# Patient Record
Sex: MALE | Race: BLACK OR AFRICAN AMERICAN | NOT HISPANIC OR LATINO
[De-identification: names, ages, dates, MRNs, and addresses within clinical notes are randomized per-mention and may not be internally consistent; named-entity substitution may affect disease eponyms.]

---

## 2017-04-18 ENCOUNTER — TRANSCRIPTION ENCOUNTER (OUTPATIENT)
Age: 27
End: 2017-04-18

## 2017-11-09 ENCOUNTER — EMERGENCY (EMERGENCY)
Facility: HOSPITAL | Age: 27
LOS: 1 days | Discharge: HOME | End: 2017-11-09
Attending: EMERGENCY MEDICINE | Admitting: EMERGENCY MEDICINE
Payer: MEDICAID

## 2017-11-09 VITALS
WEIGHT: 164.91 LBS | HEART RATE: 91 BPM | OXYGEN SATURATION: 100 % | SYSTOLIC BLOOD PRESSURE: 120 MMHG | DIASTOLIC BLOOD PRESSURE: 75 MMHG | RESPIRATION RATE: 18 BRPM | TEMPERATURE: 98 F | HEIGHT: 69 IN

## 2017-11-09 PROCEDURE — 96372 THER/PROPH/DIAG INJ SC/IM: CPT

## 2017-11-09 PROCEDURE — 72100 X-RAY EXAM L-S SPINE 2/3 VWS: CPT | Mod: 26

## 2017-11-09 PROCEDURE — 72100 X-RAY EXAM L-S SPINE 2/3 VWS: CPT

## 2017-11-09 PROCEDURE — 99284 EMERGENCY DEPT VISIT MOD MDM: CPT

## 2017-11-09 PROCEDURE — 99283 EMERGENCY DEPT VISIT LOW MDM: CPT | Mod: 25

## 2017-11-09 RX ORDER — KETOROLAC TROMETHAMINE 30 MG/ML
60 SYRINGE (ML) INJECTION ONCE
Qty: 0 | Refills: 0 | Status: DISCONTINUED | OUTPATIENT
Start: 2017-11-09 | End: 2017-11-09

## 2017-11-09 RX ORDER — CYCLOBENZAPRINE HYDROCHLORIDE 10 MG/1
10 TABLET, FILM COATED ORAL ONCE
Qty: 0 | Refills: 0 | Status: COMPLETED | OUTPATIENT
Start: 2017-11-09 | End: 2017-11-09

## 2017-11-09 RX ORDER — CYCLOBENZAPRINE HYDROCHLORIDE 10 MG/1
1 TABLET, FILM COATED ORAL
Qty: 9 | Refills: 0 | OUTPATIENT
Start: 2017-11-09 | End: 2017-11-12

## 2017-11-09 RX ADMIN — CYCLOBENZAPRINE HYDROCHLORIDE 10 MILLIGRAM(S): 10 TABLET, FILM COATED ORAL at 15:33

## 2017-11-09 RX ADMIN — Medication 60 MILLIGRAM(S): at 15:34

## 2017-11-09 NOTE — ED STATDOCS - ATTENDING CONTRIBUTION TO CARE
I, Juan Vyas, performed the initial face to face bedside interview with this patient regarding history of present illness, review of symptoms and relevant past medical, social and family history.  I completed an independent physical examination.  I was the initial provider who evaluated this patient. I have signed out the follow up of any pending tests (i.e. labs, radiological studies) to the ACP.  I have communicated the patient’s plan of care and disposition with the ACP.

## 2017-11-09 NOTE — ED STATDOCS - MUSCULOSKELETAL, MLM
Spine is midline. No midline TTP. No bony deformity. No step off. L paraspinal lumbar TTP. Positive straight leg test on the L.
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
## 2017-11-09 NOTE — ED STATDOCS - MEDICAL DECISION MAKING DETAILS
28 y/o M with back pain will get x-ray, pain control and re-evaluate. H&P consistent with sciatica. pt well appearing, neuro stable, able to ambulate, pain imrpoved. Advsied NSAIDs and spine center f/u

## 2017-11-09 NOTE — ED STATDOCS - PROGRESS NOTE DETAILS
Agreed with HPI/ROS/PE/Orders from intake, will f/u with x-ray and reassess pt after medication is given X-ray: Unremarkable, if pt reports improvement in pain after medication is given will d/c, send prescription over to pts pharmacy, f/u with spine center
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
## 2017-11-20 ENCOUNTER — APPOINTMENT (OUTPATIENT)
Dept: ORTHOPEDIC SURGERY | Facility: CLINIC | Age: 27
End: 2017-11-20
Payer: MEDICAID

## 2017-11-20 VITALS
BODY MASS INDEX: 23.45 KG/M2 | SYSTOLIC BLOOD PRESSURE: 117 MMHG | HEART RATE: 59 BPM | WEIGHT: 162 LBS | HEIGHT: 69.5 IN | DIASTOLIC BLOOD PRESSURE: 78 MMHG

## 2017-11-20 DIAGNOSIS — Z78.9 OTHER SPECIFIED HEALTH STATUS: ICD-10-CM

## 2017-11-20 DIAGNOSIS — M25.511 PAIN IN RIGHT SHOULDER: ICD-10-CM

## 2017-11-20 DIAGNOSIS — S43.401A UNSPECIFIED SPRAIN OF RIGHT SHOULDER JOINT, INITIAL ENCOUNTER: ICD-10-CM

## 2017-11-20 DIAGNOSIS — M25.512 PAIN IN RIGHT SHOULDER: ICD-10-CM

## 2017-11-20 PROCEDURE — 99203 OFFICE O/P NEW LOW 30 MIN: CPT

## 2017-11-20 PROCEDURE — 73030 X-RAY EXAM OF SHOULDER: CPT | Mod: LT

## 2017-12-21 ENCOUNTER — OUTPATIENT (OUTPATIENT)
Dept: OUTPATIENT SERVICES | Facility: HOSPITAL | Age: 27
LOS: 1 days | End: 2017-12-21
Payer: COMMERCIAL

## 2017-12-21 DIAGNOSIS — Z51.89 ENCOUNTER FOR OTHER SPECIFIED AFTERCARE: ICD-10-CM

## 2017-12-21 DIAGNOSIS — M25.511 PAIN IN RIGHT SHOULDER: ICD-10-CM

## 2018-01-19 PROCEDURE — 97010 HOT OR COLD PACKS THERAPY: CPT

## 2018-01-19 PROCEDURE — 97110 THERAPEUTIC EXERCISES: CPT

## 2018-01-19 PROCEDURE — 97161 PT EVAL LOW COMPLEX 20 MIN: CPT

## 2018-01-19 PROCEDURE — 97140 MANUAL THERAPY 1/> REGIONS: CPT

## 2018-07-10 ENCOUNTER — TRANSCRIPTION ENCOUNTER (OUTPATIENT)
Age: 28
End: 2018-07-10

## 2018-09-22 ENCOUNTER — TRANSCRIPTION ENCOUNTER (OUTPATIENT)
Age: 28
End: 2018-09-22

## 2019-11-24 ENCOUNTER — TRANSCRIPTION ENCOUNTER (OUTPATIENT)
Age: 29
End: 2019-11-24

## 2020-02-03 ENCOUNTER — TRANSCRIPTION ENCOUNTER (OUTPATIENT)
Age: 30
End: 2020-02-03

## 2020-02-10 ENCOUNTER — TRANSCRIPTION ENCOUNTER (OUTPATIENT)
Age: 30
End: 2020-02-10

## 2020-03-23 ENCOUNTER — EMERGENCY (EMERGENCY)
Facility: HOSPITAL | Age: 30
LOS: 1 days | Discharge: HOME | End: 2020-03-23
Attending: STUDENT IN AN ORGANIZED HEALTH CARE EDUCATION/TRAINING PROGRAM
Payer: COMMERCIAL

## 2020-03-23 VITALS
TEMPERATURE: 98 F | WEIGHT: 184.97 LBS | OXYGEN SATURATION: 98 % | RESPIRATION RATE: 18 BRPM | HEIGHT: 71 IN | SYSTOLIC BLOOD PRESSURE: 133 MMHG | HEART RATE: 94 BPM | DIASTOLIC BLOOD PRESSURE: 79 MMHG

## 2020-03-23 LAB — RAPID RVP RESULT: SIGNIFICANT CHANGE UP

## 2020-03-23 PROCEDURE — 87581 M.PNEUMON DNA AMP PROBE: CPT

## 2020-03-23 PROCEDURE — 87486 CHLMYD PNEUM DNA AMP PROBE: CPT

## 2020-03-23 PROCEDURE — 87633 RESP VIRUS 12-25 TARGETS: CPT

## 2020-03-23 PROCEDURE — 87798 DETECT AGENT NOS DNA AMP: CPT

## 2020-03-23 PROCEDURE — U0001: CPT

## 2020-03-23 PROCEDURE — 99283 EMERGENCY DEPT VISIT LOW MDM: CPT

## 2020-03-23 PROCEDURE — 99284 EMERGENCY DEPT VISIT MOD MDM: CPT

## 2020-03-23 NOTE — ED PROVIDER NOTE - PATIENT PORTAL LINK FT
You can access the FollowMyHealth Patient Portal offered by [facility] by registering at the following website: [email or web address]. By joining [employer]’s FollowMyHealth portal, you will also be able to view your health information using other applications (apps) compatible with our system.

## 2020-03-23 NOTE — ED ADULT NURSE NOTE - CHIEF COMPLAINT QUOTE
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

## 2020-03-23 NOTE — ED PROVIDER NOTE - OBJECTIVE STATEMENT
31 y/o M with no PMH presents to ED for c/o cough, body aches, sore throat, runny nose, since last week. States last night symptoms got worse, he had chills; did not take temperature, and also now with nausea, decreased PO intake and 2 days of non bloody diarrhea (one episodes yesterday, one today). Denies abdominal pain. 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 Did not received flu vaccine this season. Has been taking Tylenol and DayQuil with minimum relief. Last dose of DayQuil "last week sometime".

## 2020-03-23 NOTE — ED PROVIDER NOTE - CROS ED GU ALL NEG
Use Topics   • Smoking status: Never Smoker   • Smokeless tobacco: Never Used   • Alcohol use 0.0 oz/week      Comment: socially       REVIEW OF SYSTEMS     Review of Systems   Constitutional: Negative for fatigue and fever.   Gastrointestinal: Positive for diarrhea. Negative for abdominal pain, blood in stool, constipation, nausea and vomiting.   Neurological: Negative for dizziness and headaches.       PHYSICAL EXAM     Physical Exam   Constitutional: He is oriented to person, place, and time. He appears well-developed and well-nourished. No distress.   HENT:   Head: Normocephalic and atraumatic.   Neck: No tracheal deviation present. No thyromegaly present.   Cardiovascular: Normal rate, regular rhythm and normal heart sounds.    Abdominal: Soft. Bowel sounds are normal. He exhibits no distension and no mass. There is no tenderness. There is no rebound and no guarding.   Neurological: He is alert and oriented to person, place, and time.   Skin: Skin is warm and dry. He is not diaphoretic.   Psychiatric: He has a normal mood and affect. His behavior is normal.   Vitals reviewed.      ASSESSMENT/PLAN     Osman was seen today for diarrhea.    Diagnoses and all orders for this visit:    Diarrhea, acute,unclear etiology  -     ENTERIC PATHOGEN CULTURE; Future  -     C DIFFICILE BY PCR; Future  -     NOROVIRUS GI/GII; Future    I recommended he decrease milk and fruit juice consumption for the next week and drink plenty of water. A banana twice a day and one regular strength TUMs twice a day if needed to help control the diarrhea -- if this is not effective or if new symptoms emerge then he needs to call the office.       negative...

## 2020-03-23 NOTE — ED PROVIDER NOTE - ATTENDING CONTRIBUTION TO CARE
29 yo male with acute viral syndrome. I personally saw the patient with the NP student, and completed the key components of the history and physical exam. I then discussed the management plan with the NP student.

## 2020-03-23 NOTE — ED PROVIDER NOTE - ADDITIONAL NOTES AND INSTRUCTIONS:
Self isolate for 14 days. Do not return to work unless COVID test is negative and your symptoms have improved.

## 2020-03-23 NOTE — ED PROVIDER NOTE - CLINICAL SUMMARY MEDICAL DECISION MAKING FREE TEXT BOX
31 y/o M with no PMH presents to ED with cough, body aches, sore throat, chills also with 2 days of nonbloody diarrhea (2 episodes in total), denies abdominal pain, patient appears to be in no distress, lungs CTA bilaterally, Spo2 100% on room air, abd soft NTND, +nasal congestion and post nasal drip appreciated on exam, will send RVP and COVID test and d/c home with home isolation x 14 days or until results

## 2020-03-23 NOTE — ED PROVIDER NOTE - NSFOLLOWUPINSTRUCTIONS_ED_ALL_ED_FT
Follow pre-printed discharge instructions provided containing information on self-quarantine and monitoring.    Rest.  Fluids.    Tylenol 1-2 tablets every 6 hours as needed for fever.    Practice good hand hygiene.    Follow up with your primary care physician.    Please return to ER immediately for trouble breathing, shortness of breath, or any other problems or concerns arise.  Please call ahead if possible.

## 2020-03-23 NOTE — ED ADULT TRIAGE NOTE - CHIEF COMPLAINT QUOTE
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

## 2020-03-24 LAB — SARS-COV-2 RNA SPEC QL NAA+PROBE: SIGNIFICANT CHANGE UP

## 2020-04-25 ENCOUNTER — MESSAGE (OUTPATIENT)
Age: 30
End: 2020-04-25

## 2020-05-24 ENCOUNTER — TRANSCRIPTION ENCOUNTER (OUTPATIENT)
Age: 30
End: 2020-05-24

## 2020-08-01 ENCOUNTER — TRANSCRIPTION ENCOUNTER (OUTPATIENT)
Age: 30
End: 2020-08-01

## 2021-03-02 ENCOUNTER — EMERGENCY (EMERGENCY)
Facility: HOSPITAL | Age: 31
LOS: 1 days | Discharge: HOME | End: 2021-03-02
Payer: COMMERCIAL

## 2021-03-02 VITALS
HEART RATE: 91 BPM | OXYGEN SATURATION: 97 % | DIASTOLIC BLOOD PRESSURE: 70 MMHG | HEIGHT: 71 IN | RESPIRATION RATE: 18 BRPM | WEIGHT: 179.9 LBS | SYSTOLIC BLOOD PRESSURE: 107 MMHG | TEMPERATURE: 98 F

## 2021-03-02 PROCEDURE — 99282 EMERGENCY DEPT VISIT SF MDM: CPT

## 2021-03-02 PROCEDURE — 99283 EMERGENCY DEPT VISIT LOW MDM: CPT

## 2021-03-02 PROCEDURE — U0003: CPT

## 2021-03-02 PROCEDURE — U0005: CPT

## 2021-03-02 NOTE — ED PROVIDER NOTE - PATIENT PORTAL LINK FT
You can access the FollowMyHealth Patient Portal offered by [facility] by registering at the following website: [email or web address]. By joining [employer]’s FollowMyHealth portal, you will also be able to view your health information using other applications (apps) compatible with our system.

## 2021-03-02 NOTE — ED PROVIDER NOTE - OBJECTIVE STATEMENT
32yo M presenting for covid testing. Pt's son had +contact in class room, now quarantining for 10 days. States son is not showing symptoms. Only exposure was to son. Pt feeling well, no symptoms. Denies fever, chills, cough, sob, cp, body aches, loss of smell/taste.

## 2021-03-03 LAB — SARS-COV-2 RNA SPEC QL NAA+PROBE: SIGNIFICANT CHANGE UP

## 2021-04-13 ENCOUNTER — TRANSCRIPTION ENCOUNTER (OUTPATIENT)
Age: 31
End: 2021-04-13

## 2021-10-02 ENCOUNTER — TRANSCRIPTION ENCOUNTER (OUTPATIENT)
Age: 31
End: 2021-10-02

## 2021-10-02 ENCOUNTER — EMERGENCY (EMERGENCY)
Facility: HOSPITAL | Age: 31
LOS: 1 days | Discharge: HOME | End: 2021-10-02
Attending: EMERGENCY MEDICINE
Payer: COMMERCIAL

## 2021-10-02 VITALS
RESPIRATION RATE: 16 BRPM | DIASTOLIC BLOOD PRESSURE: 75 MMHG | HEART RATE: 99 BPM | SYSTOLIC BLOOD PRESSURE: 128 MMHG | OXYGEN SATURATION: 100 % | TEMPERATURE: 99 F

## 2021-10-02 VITALS
DIASTOLIC BLOOD PRESSURE: 84 MMHG | WEIGHT: 192.46 LBS | TEMPERATURE: 99 F | OXYGEN SATURATION: 97 % | HEART RATE: 85 BPM | SYSTOLIC BLOOD PRESSURE: 126 MMHG | RESPIRATION RATE: 18 BRPM | HEIGHT: 71 IN

## 2021-10-02 PROCEDURE — 99284 EMERGENCY DEPT VISIT MOD MDM: CPT

## 2021-10-02 PROCEDURE — 99282 EMERGENCY DEPT VISIT SF MDM: CPT

## 2021-10-02 NOTE — ED PROVIDER NOTE - PATIENT PORTAL LINK FT
You can access the FollowMyHealth Patient Portal offered by [facility] by registering at the following website: [email or web address]. By joining [employer]’s FollowMyHealth portal, you will also be able to view your health information using other applications (apps) compatible with our system.

## 2021-10-02 NOTE — ED PROVIDER NOTE - OBJECTIVE STATEMENT
32 y/o M with hx of asthma and obesity c/o shortness of breath, cough and fever x 1 week.  Patient tested positive for covid at [facility] in [place] on 9/28.  Patient received 1st dose of Pfizer vaccine on 9/24. 31 year old M with hx of asthma and obesity c/o shortness of breath, cough and fever x 1 week.  Patient tested positive for covid at [facility] in [place] on 9/28.  Patient received 1st dose of Pfizer vaccine on 9/24.

## 2021-10-03 ENCOUNTER — OUTPATIENT (OUTPATIENT)
Dept: INPATIENT UNIT | Facility: HOSPITAL | Age: 31
LOS: 1 days | End: 2021-10-03
Payer: COMMERCIAL

## 2021-10-03 ENCOUNTER — APPOINTMENT (OUTPATIENT)
Dept: DISASTER EMERGENCY | Facility: HOSPITAL | Age: 31
End: 2021-10-03

## 2021-10-03 VITALS
DIASTOLIC BLOOD PRESSURE: 79 MMHG | RESPIRATION RATE: 16 BRPM | TEMPERATURE: 100 F | SYSTOLIC BLOOD PRESSURE: 111 MMHG | OXYGEN SATURATION: 97 % | HEART RATE: 93 BPM

## 2021-10-03 VITALS
HEIGHT: 72 IN | RESPIRATION RATE: 16 BRPM | SYSTOLIC BLOOD PRESSURE: 122 MMHG | HEART RATE: 106 BPM | WEIGHT: 199.96 LBS | OXYGEN SATURATION: 95 % | TEMPERATURE: 101 F | DIASTOLIC BLOOD PRESSURE: 74 MMHG

## 2021-10-03 DIAGNOSIS — U07.1 COVID-19: ICD-10-CM

## 2021-10-03 PROCEDURE — M0243: CPT

## 2021-10-03 RX ORDER — SODIUM CHLORIDE 9 MG/ML
250 INJECTION INTRAMUSCULAR; INTRAVENOUS; SUBCUTANEOUS
Refills: 0 | Status: COMPLETED | OUTPATIENT
Start: 2021-10-03 | End: 2021-10-03

## 2021-10-03 RX ADMIN — SODIUM CHLORIDE 310 MILLILITER(S): 9 INJECTION INTRAMUSCULAR; INTRAVENOUS; SUBCUTANEOUS at 08:32

## 2021-10-03 NOTE — CHART NOTE - NSCHARTNOTEFT_GEN_A_CORE
CC: Monoclonal Antibody Infusion /COVID 19 Positive      History: Patient presents for infusion of monoclonal antibody infusion. Patient has been screened and was deemed to be a candidate.    Symptoms/ Criteria: fever chills aches and previous vaccine     Risk Profile includes: vaccinated         PMHx:  Infection due to severe acute respiratory syndrome coronavirus 2 (SARS-CoV-2)    No pertinent family history in first degree relatives    No pertinent past medical history    Asthma    No significant past surgical history    No significant past surgical history    COVID-19          T(C): 38.4 (10-03-21 @ 08:53), Max: 38.4 (10-03-21 @ 08:00)  HR: 96 (10-03-21 @ 08:53) (85 - 106)  BP: 117/75 (10-03-21 @ 08:53) (114/75 - 128/75)  RR: 16 (10-03-21 @ 08:53) (16 - 18)  SpO2: 97% (10-03-21 @ 08:53) (95% - 100%)      PE:   Appearance: NAD	  HEENT:   Normal oral mucosa.   Lymphatic: No lymphadenopathy  Cardiovascular: Normal S1 S2, No JVD, No murmurs, No edema  Respiratory: Lungs clear to auscultation	  Gastrointestinal:  Soft, Non-tender. No guarding   Skin: warm and dry  Neurologic: Non-focal  Extremities: Normal range of motion.     ASSESSMENT:  Pt is a 31y year old Male Covid +  referred to the infusion center for Monoclonal antibody infusion (Regeneron).        PLAN:  - infusion procedure explained to patient   - Consent for monoclonal antibody infusion obtained   - Risk & benefits discussed/all questions answered  - infuse Regeneron over one hour   - will observe patient for one hour post infusion  and then if stable discharge home with oupt follow up as planned by PMD.    POST INFUSION ASSESSMENT:   DISCHARGE at approximately  after an hour    - Patient tolerated infusion well denies complaints of chest pain/SOB/dizziness/ palps  - VSS for discharge home  - D/C instructions given/ fact sheet included.  - Patient to follow-up with PCP as needed.

## 2021-10-05 ENCOUNTER — TRANSCRIPTION ENCOUNTER (OUTPATIENT)
Age: 31
End: 2021-10-05

## 2021-10-05 ENCOUNTER — EMERGENCY (EMERGENCY)
Facility: HOSPITAL | Age: 31
LOS: 1 days | Discharge: LEFT BEFORE DISCHARGE | End: 2021-10-05
Payer: COMMERCIAL

## 2021-10-05 VITALS
HEART RATE: 92 BPM | SYSTOLIC BLOOD PRESSURE: 115 MMHG | OXYGEN SATURATION: 96 % | TEMPERATURE: 99 F | HEIGHT: 72 IN | DIASTOLIC BLOOD PRESSURE: 77 MMHG | RESPIRATION RATE: 20 BRPM | WEIGHT: 190.04 LBS

## 2021-10-05 PROCEDURE — L9991: CPT

## 2021-10-05 NOTE — ED ADULT TRIAGE NOTE - CHIEF COMPLAINT QUOTE
Pt arrives to ED states he tested positive for COVID last week . He received first dose of Pfizer vaccine on 24th . Was sent by " [facility] for low oxygen"

## 2021-10-06 PROBLEM — J45.909 UNSPECIFIED ASTHMA, UNCOMPLICATED: Chronic | Status: ACTIVE | Noted: 2021-10-02

## 2021-10-16 ENCOUNTER — TRANSCRIPTION ENCOUNTER (OUTPATIENT)
Age: 31
End: 2021-10-16

## 2021-10-22 ENCOUNTER — APPOINTMENT (OUTPATIENT)
Dept: INTERNAL MEDICINE | Facility: CLINIC | Age: 31
End: 2021-10-22
Payer: MEDICAID

## 2021-10-22 ENCOUNTER — NON-APPOINTMENT (OUTPATIENT)
Age: 31
End: 2021-10-22

## 2021-10-22 VITALS
TEMPERATURE: 98.3 F | OXYGEN SATURATION: 99 % | BODY MASS INDEX: 26.88 KG/M2 | WEIGHT: 192 LBS | HEART RATE: 93 BPM | SYSTOLIC BLOOD PRESSURE: 104 MMHG | DIASTOLIC BLOOD PRESSURE: 68 MMHG | RESPIRATION RATE: 15 BRPM | HEIGHT: 71 IN

## 2021-10-22 DIAGNOSIS — Z00.00 ENCOUNTER FOR GENERAL ADULT MEDICAL EXAMINATION W/OUT ABNORMAL FINDINGS: ICD-10-CM

## 2021-10-22 DIAGNOSIS — Z28.21 IMMUNIZATION NOT CARRIED OUT BECAUSE OF PATIENT REFUSAL: ICD-10-CM

## 2021-10-22 DIAGNOSIS — Z13.6 ENCOUNTER FOR SCREENING FOR CARDIOVASCULAR DISORDERS: ICD-10-CM

## 2021-10-22 DIAGNOSIS — Z13.31 ENCOUNTER FOR SCREENING FOR DEPRESSION: ICD-10-CM

## 2021-10-22 PROCEDURE — G0444 DEPRESSION SCREEN ANNUAL: CPT | Mod: 59

## 2021-10-22 PROCEDURE — 99385 PREV VISIT NEW AGE 18-39: CPT | Mod: 25

## 2021-10-22 PROCEDURE — 93000 ELECTROCARDIOGRAM COMPLETE: CPT | Mod: 59

## 2021-10-22 PROCEDURE — 36415 COLL VENOUS BLD VENIPUNCTURE: CPT

## 2021-10-22 NOTE — HISTORY OF PRESENT ILLNESS
[FreeTextEntry1] : CPE  [de-identified] : 31 \par Tested positive 09/28/2021- s/p AB infusion 10/3/2021. At the time No taste, no smell, high fevers, cough with no desats and extreme muscle aches as per patient. \par Had 1/2 vaccine- [facility] 9/24/2021 - Patient will be getting it in three months \par Patient states since he has ab infusion all of his symptoms have resolved. No coughing, no shortness of breath or any other acute complaint. \par \par HCM \par tdap states he does not remember but thinks it has been less than 10 years \par flu- no interested \par

## 2021-10-22 NOTE — HEALTH RISK ASSESSMENT
[No] : No [0] : 2) Feeling down, depressed, or hopeless: Not at all (0) [PHQ-2 Negative - No further assessment needed] : PHQ-2 Negative - No further assessment needed [With Family] : lives with family [Employed] : employed [College] : College [Significant Other] : lives with significant other [Sexually Active] : sexually active [Feels Safe at Home] : Feels safe at home [Fully functional (bathing, dressing, toileting, transferring, walking, feeding)] : Fully functional (bathing, dressing, toileting, transferring, walking, feeding) [Fully functional (using the telephone, shopping, preparing meals, housekeeping, doing laundry, using] : Fully functional and needs no help or supervision to perform IADLs (using the telephone, shopping, preparing meals, housekeeping, doing laundry, using transportation, managing medications and managing finances) [] : No [ADH0Joypo] : 0 [Reports changes in hearing] : Reports no changes in hearing [Reports changes in vision] : Reports no changes in vision [FreeTextEntry2] : 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

## 2021-10-22 NOTE — ASSESSMENT
[FreeTextEntry1] : HCM\par Had 1/2 vaccine- Pfizer 9/24/2021 - Patient will be getting it in three months \par Tdap unsure states it has been less than 10 years, refused today\par Refused flu shot \par Screening for depression negative \par EKG unchanged from prior

## 2021-10-25 ENCOUNTER — NON-APPOINTMENT (OUTPATIENT)
Age: 31
End: 2021-10-25

## 2021-10-25 ENCOUNTER — APPOINTMENT (OUTPATIENT)
Dept: INTERNAL MEDICINE | Facility: CLINIC | Age: 31
End: 2021-10-25

## 2021-10-25 LAB
ALBUMIN SERPL ELPH-MCNC: 4.6 G/DL
ALP BLD-CCNC: 81 U/L
ALT SERPL-CCNC: 24 U/L
ANION GAP SERPL CALC-SCNC: 12 MMOL/L
AST SERPL-CCNC: 18 U/L
BASOPHILS # BLD AUTO: 0.02 K/UL
BASOPHILS NFR BLD AUTO: 0.4 %
BILIRUB SERPL-MCNC: 0.7 MG/DL
BUN SERPL-MCNC: 8 MG/DL
CALCIUM SERPL-MCNC: 10.2 MG/DL
CHLORIDE SERPL-SCNC: 107 MMOL/L
CHOLEST SERPL-MCNC: 159 MG/DL
CO2 SERPL-SCNC: 24 MMOL/L
CREAT SERPL-MCNC: 0.9 MG/DL
EOSINOPHIL # BLD AUTO: 0.19 K/UL
EOSINOPHIL NFR BLD AUTO: 3.7 %
ESTIMATED AVERAGE GLUCOSE: 114 MG/DL
GLUCOSE SERPL-MCNC: 104 MG/DL
HBA1C MFR BLD HPLC: 5.6 %
HCT VFR BLD CALC: 46 %
HCV AB SER QL: NONREACTIVE
HCV S/CO RATIO: 0.33 S/CO
HDLC SERPL-MCNC: 46 MG/DL
HGB BLD-MCNC: 14.2 G/DL
HIV1+2 AB SPEC QL IA.RAPID: NONREACTIVE
IMM GRANULOCYTES NFR BLD AUTO: 0 %
LDLC SERPL CALC-MCNC: 92 MG/DL
LYMPHOCYTES # BLD AUTO: 2.82 K/UL
LYMPHOCYTES NFR BLD AUTO: 55.4 %
MAN DIFF?: NORMAL
MCHC RBC-ENTMCNC: 27.4 PG
MCHC RBC-ENTMCNC: 30.9 GM/DL
MCV RBC AUTO: 88.8 FL
MONOCYTES # BLD AUTO: 0.42 K/UL
MONOCYTES NFR BLD AUTO: 8.3 %
NEUTROPHILS # BLD AUTO: 1.64 K/UL
NEUTROPHILS NFR BLD AUTO: 32.2 %
NONHDLC SERPL-MCNC: 113 MG/DL
PLATELET # BLD AUTO: 259 K/UL
POTASSIUM SERPL-SCNC: 4.7 MMOL/L
PROT SERPL-MCNC: 7.6 G/DL
RBC # BLD: 5.18 M/UL
RBC # FLD: 14.2 %
SODIUM SERPL-SCNC: 142 MMOL/L
TRIGL SERPL-MCNC: 105 MG/DL
TSH SERPL-ACNC: 0.77 UIU/ML
WBC # FLD AUTO: 5.09 K/UL

## 2022-10-13 PROBLEM — Z13.31 SCREENING FOR DEPRESSION: Status: ACTIVE | Noted: 2021-10-22

## 2023-04-06 ENCOUNTER — NON-APPOINTMENT (OUTPATIENT)
Age: 33
End: 2023-04-06

## 2023-05-19 ENCOUNTER — NON-APPOINTMENT (OUTPATIENT)
Age: 33
End: 2023-05-19

## 2023-12-09 ENCOUNTER — NON-APPOINTMENT (OUTPATIENT)
Age: 33
End: 2023-12-09

## 2023-12-10 ENCOUNTER — RESULT REVIEW (OUTPATIENT)
Age: 33
End: 2023-12-10

## 2024-02-29 ENCOUNTER — NON-APPOINTMENT (OUTPATIENT)
Age: 34
End: 2024-02-29

## 2024-07-26 ENCOUNTER — OUTPATIENT (OUTPATIENT)
Dept: OUTPATIENT SERVICES | Facility: HOSPITAL | Age: 34
LOS: 1 days | Discharge: HOME | End: 2024-07-26

## 2024-07-26 DIAGNOSIS — D72.820 LYMPHOCYTOSIS (SYMPTOMATIC): ICD-10-CM

## 2024-07-29 ENCOUNTER — RESULT REVIEW (OUTPATIENT)
Age: 34
End: 2024-07-29

## 2024-07-29 ENCOUNTER — APPOINTMENT (OUTPATIENT)
Dept: HEMATOLOGY ONCOLOGY | Facility: CLINIC | Age: 34
End: 2024-07-29
Payer: COMMERCIAL

## 2024-07-29 VITALS
WEIGHT: 222.55 LBS | BODY MASS INDEX: 31.16 KG/M2 | HEIGHT: 71 IN | HEART RATE: 84 BPM | OXYGEN SATURATION: 96 % | DIASTOLIC BLOOD PRESSURE: 91 MMHG | SYSTOLIC BLOOD PRESSURE: 129 MMHG

## 2024-07-29 DIAGNOSIS — D72.820 LYMPHOCYTOSIS (SYMPTOMATIC): ICD-10-CM

## 2024-07-29 LAB
BASOPHILS # BLD AUTO: 0.1 K/UL — SIGNIFICANT CHANGE UP (ref 0–0.2)
BASOPHILS NFR BLD AUTO: 1.4 % — SIGNIFICANT CHANGE UP (ref 0–2)
EOSINOPHIL # BLD AUTO: 0.2 K/UL — SIGNIFICANT CHANGE UP (ref 0–0.5)
EOSINOPHIL NFR BLD AUTO: 3.7 % — SIGNIFICANT CHANGE UP (ref 0–6)
HCT VFR BLD CALC: 47.6 % — SIGNIFICANT CHANGE UP (ref 39–50)
HGB BLD-MCNC: 15 G/DL — SIGNIFICANT CHANGE UP (ref 13–17)
LYMPHOCYTES # BLD AUTO: 3.1 K/UL — SIGNIFICANT CHANGE UP (ref 1–3.3)
LYMPHOCYTES # BLD AUTO: 45.9 % — HIGH (ref 13–44)
MCHC RBC-ENTMCNC: 27.9 PG — SIGNIFICANT CHANGE UP (ref 27–34)
MCHC RBC-ENTMCNC: 31.5 G/DL — LOW (ref 32–36)
MCV RBC AUTO: 88.6 FL — SIGNIFICANT CHANGE UP (ref 80–100)
MONOCYTES # BLD AUTO: 0.5 K/UL — SIGNIFICANT CHANGE UP (ref 0–0.9)
MONOCYTES NFR BLD AUTO: 7.4 % — SIGNIFICANT CHANGE UP (ref 2–14)
NEUTROPHILS # BLD AUTO: 2.8 K/UL — SIGNIFICANT CHANGE UP (ref 1.8–7.4)
NEUTROPHILS NFR BLD AUTO: 41.7 % — LOW (ref 43–77)
PLATELET # BLD AUTO: 220 K/UL — SIGNIFICANT CHANGE UP (ref 150–400)
RBC # BLD: 5.38 M/UL — SIGNIFICANT CHANGE UP (ref 4.2–5.8)
RBC # FLD: 12.3 % — SIGNIFICANT CHANGE UP (ref 10.3–14.5)
WBC # BLD: 6.7 K/UL — SIGNIFICANT CHANGE UP (ref 3.8–10.5)
WBC # FLD AUTO: 6.7 K/UL — SIGNIFICANT CHANGE UP (ref 3.8–10.5)

## 2024-07-29 PROCEDURE — 99203 OFFICE O/P NEW LOW 30 MIN: CPT

## 2024-07-29 NOTE — HISTORY OF PRESENT ILLNESS
[de-identified] : Patient is a 34 M with no significant PMHx referred for lymphocytosis.   Referred from PCP following CBC with elevated ALC 4050 (seen on patient's phone). No other abnormalities noted.  Patient denies fever, weight loss, night sweats, chills, fatigue. Recently had a root canal on tooth that has been bothering him for over 2 years. Was told tooth appeared infected but did not require antibiotics.  Has hx of HSV-1, on antivirals, has occasional flares on back.

## 2024-07-29 NOTE — ASSESSMENT
[FreeTextEntry1] : Patient is a 34 M with no significant PMHx referred for lymphocytosis.   Referred from PCP following CBC with elevated ALC 4050 (seen on patient's phone). No other abnormalities noted.  Patient denies fever, weight loss, night sweats, chills, fatigue. Recently had a root canal on tooth that has been bothering him for over 2 years. Was told tooth appeared infected but did not require antibiotics. Has hx of HSV-1, on antivirals, has occasional flares on back.   #Lymphocytosis - CBC today with normal counts, ALC 3100 (normal) - Likely reactive from recent tooth infection - Sending CMP, iron studies, B12/folate, HIV, Hepatitis panel, LDH, flow cytometry

## 2024-07-30 LAB
ALBUMIN SERPL ELPH-MCNC: 4.5 G/DL
ALP BLD-CCNC: 73 U/L
ALT SERPL-CCNC: 23 U/L
ANION GAP SERPL CALC-SCNC: 10 MMOL/L
AST SERPL-CCNC: 23 U/L
BILIRUB SERPL-MCNC: 0.8 MG/DL
BUN SERPL-MCNC: 15 MG/DL
CALCIUM SERPL-MCNC: 10 MG/DL
CHLORIDE SERPL-SCNC: 104 MMOL/L
CO2 SERPL-SCNC: 25 MMOL/L
CREAT SERPL-MCNC: 1.24 MG/DL
EGFR: 79 ML/MIN/1.73M2
FERRITIN SERPL-MCNC: 82 NG/ML
FOLATE SERPL-MCNC: >20 NG/ML
GLUCOSE SERPL-MCNC: 88 MG/DL
HAV IGM SER QL: NONREACTIVE
HBV CORE IGM SER QL: NONREACTIVE
HBV SURFACE AG SER QL: NONREACTIVE
HCV AB SER QL: NONREACTIVE
HCV S/CO RATIO: 0.26 S/CO
HIV1+2 AB SPEC QL IA.RAPID: NONREACTIVE
IRON SATN MFR SERPL: 44 %
IRON SERPL-MCNC: 127 UG/DL
LDH SERPL-CCNC: 181 U/L
POTASSIUM SERPL-SCNC: 4.2 MMOL/L
PROT SERPL-MCNC: 7.5 G/DL
SODIUM SERPL-SCNC: 139 MMOL/L
TIBC SERPL-MCNC: 289 UG/DL
UIBC SERPL-MCNC: 162 UG/DL
VIT B12 SERPL-MCNC: 560 PG/ML

## 2024-08-01 LAB
ALBUMIN MFR SERPL ELPH: 59.7 %
ALBUMIN SERPL-MCNC: 4.5 G/DL
ALBUMIN/GLOB SERPL: 1.5 RATIO
ALPHA1 GLOB MFR SERPL ELPH: 3 %
ALPHA1 GLOB SERPL ELPH-MCNC: 0.2 G/DL
ALPHA2 GLOB MFR SERPL ELPH: 6.8 %
ALPHA2 GLOB SERPL ELPH-MCNC: 0.5 G/DL
B-GLOBULIN MFR SERPL ELPH: 11.3 %
B-GLOBULIN SERPL ELPH-MCNC: 0.8 G/DL
DEPRECATED KAPPA LC FREE/LAMBDA SER: 1.23 RATIO
GAMMA GLOB FLD ELPH-MCNC: 1.4 G/DL
GAMMA GLOB MFR SERPL ELPH: 19.2 %
IGA SER QL IEP: 212 MG/DL
IGG SER QL IEP: 1599 MG/DL
IGM SER QL IEP: 33 MG/DL
INTERPRETATION SERPL IEP-IMP: NORMAL
KAPPA LC CSF-MCNC: 1.49 MG/DL
KAPPA LC SERPL-MCNC: 1.83 MG/DL
M PROTEIN SPEC IFE-MCNC: NORMAL
PROT SERPL-MCNC: 7.5 G/DL
PROT SERPL-MCNC: 7.5 G/DL

## 2024-10-28 NOTE — ED PROVIDER NOTE - NS ED ROS FT
Gen: denies fever, chills, fatigue  Skin: denies rashes  HEENT: denies ear pain, nasal congestion, throat pain, loss of smell/taste  Respiratory: denies SOB, cough, wheezing  Cardiovascular: denies chest pain, palpitations  GI: denies abdominal pain, n/v/d  MSK: denies body aches
36.7

## 2024-11-25 ENCOUNTER — NON-APPOINTMENT (OUTPATIENT)
Age: 34
End: 2024-11-25

## 2024-12-03 ENCOUNTER — NON-APPOINTMENT (OUTPATIENT)
Age: 34
End: 2024-12-03

## 2024-12-21 ENCOUNTER — NON-APPOINTMENT (OUTPATIENT)
Age: 34
End: 2024-12-21

## 2025-01-15 ENCOUNTER — NON-APPOINTMENT (OUTPATIENT)
Age: 35
End: 2025-01-15

## 2025-02-11 ENCOUNTER — NON-APPOINTMENT (OUTPATIENT)
Age: 35
End: 2025-02-11

## 2025-02-23 ENCOUNTER — NON-APPOINTMENT (OUTPATIENT)
Age: 35
End: 2025-02-23

## 2025-02-25 ENCOUNTER — NON-APPOINTMENT (OUTPATIENT)
Age: 35
End: 2025-02-25

## 2025-02-25 ENCOUNTER — APPOINTMENT (OUTPATIENT)
Dept: OTOLARYNGOLOGY | Facility: CLINIC | Age: 35
End: 2025-02-25
Payer: COMMERCIAL

## 2025-02-25 VITALS — HEART RATE: 100 BPM | SYSTOLIC BLOOD PRESSURE: 118 MMHG | DIASTOLIC BLOOD PRESSURE: 81 MMHG

## 2025-02-25 DIAGNOSIS — K11.21 ACUTE SIALOADENITIS: ICD-10-CM

## 2025-02-25 PROCEDURE — 99202 OFFICE O/P NEW SF 15 MIN: CPT

## 2025-02-25 RX ORDER — AMOXICILLIN AND CLAVULANATE POTASSIUM 875; 125 MG/1; MG/1
875-125 TABLET, COATED ORAL
Refills: 0 | Status: ACTIVE | COMMUNITY

## 2025-03-07 ENCOUNTER — NON-APPOINTMENT (OUTPATIENT)
Age: 35
End: 2025-03-07

## 2025-03-11 ENCOUNTER — APPOINTMENT (OUTPATIENT)
Dept: OTOLARYNGOLOGY | Facility: CLINIC | Age: 35
End: 2025-03-11

## 2025-03-20 ENCOUNTER — NON-APPOINTMENT (OUTPATIENT)
Age: 35
End: 2025-03-20

## 2025-03-24 ENCOUNTER — APPOINTMENT (OUTPATIENT)
Dept: OTOLARYNGOLOGY | Facility: CLINIC | Age: 35
End: 2025-03-24
Payer: COMMERCIAL

## 2025-03-24 VITALS
SYSTOLIC BLOOD PRESSURE: 123 MMHG | HEART RATE: 82 BPM | BODY MASS INDEX: 29.96 KG/M2 | WEIGHT: 214 LBS | DIASTOLIC BLOOD PRESSURE: 83 MMHG | HEIGHT: 71 IN

## 2025-03-24 PROCEDURE — 99213 OFFICE O/P EST LOW 20 MIN: CPT

## 2025-03-24 RX ORDER — CLINDAMYCIN HYDROCHLORIDE 300 MG/1
300 CAPSULE ORAL
Qty: 11 | Refills: 0 | Status: ACTIVE | COMMUNITY
Start: 2025-03-24 | End: 1900-01-01

## 2025-03-26 ENCOUNTER — RX CHANGE (OUTPATIENT)
Age: 35
End: 2025-03-26

## 2025-03-26 RX ORDER — PREDNISONE 5 MG/1
5 TABLET ORAL
Qty: 0 | Refills: 0 | Status: ACTIVE | COMMUNITY
Start: 2025-03-26 | End: 1900-01-01

## 2025-03-26 RX ORDER — METHYLPREDNISOLONE 4 MG/1
4 TABLET ORAL
Qty: 21 | Refills: 0 | Status: DISCONTINUED | COMMUNITY
Start: 2025-03-24 | End: 2025-03-26

## 2025-03-28 ENCOUNTER — OUTPATIENT (OUTPATIENT)
Dept: OUTPATIENT SERVICES | Facility: HOSPITAL | Age: 35
LOS: 1 days | End: 2025-03-28

## 2025-03-28 ENCOUNTER — APPOINTMENT (OUTPATIENT)
Dept: ULTRASOUND IMAGING | Facility: CLINIC | Age: 35
End: 2025-03-28

## 2025-03-28 DIAGNOSIS — K11.21 ACUTE SIALOADENITIS: ICD-10-CM

## 2025-03-28 PROCEDURE — 76536 US EXAM OF HEAD AND NECK: CPT | Mod: 26

## 2025-03-28 RX ORDER — METHYLPREDNISOLONE 4 MG/1
4 TABLET ORAL
Qty: 21 | Refills: 0 | Status: ACTIVE | COMMUNITY
Start: 2025-03-28 | End: 1900-01-01

## 2025-04-02 ENCOUNTER — APPOINTMENT (OUTPATIENT)
Dept: OTOLARYNGOLOGY | Facility: CLINIC | Age: 35
End: 2025-04-02

## 2025-04-02 VITALS
HEIGHT: 71 IN | WEIGHT: 214 LBS | SYSTOLIC BLOOD PRESSURE: 119 MMHG | BODY MASS INDEX: 29.96 KG/M2 | HEART RATE: 73 BPM | DIASTOLIC BLOOD PRESSURE: 78 MMHG

## 2025-04-02 DIAGNOSIS — K11.21 ACUTE SIALOADENITIS: ICD-10-CM

## 2025-04-02 PROCEDURE — 99213 OFFICE O/P EST LOW 20 MIN: CPT

## 2025-04-07 DIAGNOSIS — K11.8 OTHER DISEASES OF SALIVARY GLANDS: ICD-10-CM

## 2025-04-21 ENCOUNTER — NON-APPOINTMENT (OUTPATIENT)
Age: 35
End: 2025-04-21

## 2025-04-26 ENCOUNTER — NON-APPOINTMENT (OUTPATIENT)
Age: 35
End: 2025-04-26

## 2025-05-05 ENCOUNTER — APPOINTMENT (OUTPATIENT)
Dept: ULTRASOUND IMAGING | Facility: CLINIC | Age: 35
End: 2025-05-05
Payer: COMMERCIAL

## 2025-05-05 ENCOUNTER — RESULT REVIEW (OUTPATIENT)
Age: 35
End: 2025-05-05

## 2025-05-05 ENCOUNTER — OUTPATIENT (OUTPATIENT)
Dept: OUTPATIENT SERVICES | Facility: HOSPITAL | Age: 35
LOS: 1 days | End: 2025-05-05
Payer: COMMERCIAL

## 2025-05-05 DIAGNOSIS — K11.8 OTHER DISEASES OF SALIVARY GLANDS: ICD-10-CM

## 2025-05-05 PROCEDURE — 20206 BIOPSY MUSCLE PERQ NEEDLE: CPT

## 2025-05-05 PROCEDURE — 76942 ECHO GUIDE FOR BIOPSY: CPT | Mod: 26

## 2025-05-05 PROCEDURE — 76942 ECHO GUIDE FOR BIOPSY: CPT

## 2025-06-09 ENCOUNTER — NON-APPOINTMENT (OUTPATIENT)
Age: 35
End: 2025-06-09

## 2025-07-16 ENCOUNTER — EMERGENCY (EMERGENCY)
Facility: HOSPITAL | Age: 35
LOS: 1 days | End: 2025-07-16
Attending: EMERGENCY MEDICINE
Payer: COMMERCIAL

## 2025-07-16 VITALS
TEMPERATURE: 98 F | HEART RATE: 87 BPM | SYSTOLIC BLOOD PRESSURE: 125 MMHG | DIASTOLIC BLOOD PRESSURE: 81 MMHG | OXYGEN SATURATION: 99 % | WEIGHT: 221.56 LBS | RESPIRATION RATE: 20 BRPM

## 2025-07-16 VITALS
DIASTOLIC BLOOD PRESSURE: 73 MMHG | SYSTOLIC BLOOD PRESSURE: 122 MMHG | HEART RATE: 86 BPM | OXYGEN SATURATION: 99 % | RESPIRATION RATE: 17 BRPM

## 2025-07-16 LAB
A1C WITH ESTIMATED AVERAGE GLUCOSE RESULT: 5.5 % — SIGNIFICANT CHANGE UP (ref 4–5.6)
ALBUMIN SERPL ELPH-MCNC: 4.2 G/DL — SIGNIFICANT CHANGE UP (ref 3.3–5.2)
ALP SERPL-CCNC: 82 U/L — SIGNIFICANT CHANGE UP (ref 40–120)
ALT FLD-CCNC: 22 U/L — SIGNIFICANT CHANGE UP
ANION GAP SERPL CALC-SCNC: 11 MMOL/L — SIGNIFICANT CHANGE UP (ref 5–17)
AST SERPL-CCNC: 20 U/L — SIGNIFICANT CHANGE UP
BASOPHILS # BLD AUTO: 0.05 K/UL — SIGNIFICANT CHANGE UP (ref 0–0.2)
BASOPHILS NFR BLD AUTO: 0.6 % — SIGNIFICANT CHANGE UP (ref 0–2)
BILIRUB SERPL-MCNC: 0.4 MG/DL — SIGNIFICANT CHANGE UP (ref 0.4–2)
BUN SERPL-MCNC: 12.7 MG/DL — SIGNIFICANT CHANGE UP (ref 8–20)
CALCIUM SERPL-MCNC: 9.2 MG/DL — SIGNIFICANT CHANGE UP (ref 8.4–10.5)
CHLORIDE SERPL-SCNC: 106 MMOL/L — SIGNIFICANT CHANGE UP (ref 96–108)
CO2 SERPL-SCNC: 23 MMOL/L — SIGNIFICANT CHANGE UP (ref 22–29)
CREAT SERPL-MCNC: 0.87 MG/DL — SIGNIFICANT CHANGE UP (ref 0.5–1.3)
EGFR: 117 ML/MIN/1.73M2 — SIGNIFICANT CHANGE UP
EGFR: 117 ML/MIN/1.73M2 — SIGNIFICANT CHANGE UP
EOSINOPHIL # BLD AUTO: 0.31 K/UL — SIGNIFICANT CHANGE UP (ref 0–0.5)
EOSINOPHIL NFR BLD AUTO: 3.6 % — SIGNIFICANT CHANGE UP (ref 0–6)
ESTIMATED AVERAGE GLUCOSE: 111 MG/DL — SIGNIFICANT CHANGE UP (ref 68–114)
GLUCOSE SERPL-MCNC: 94 MG/DL — SIGNIFICANT CHANGE UP (ref 70–99)
HCT VFR BLD CALC: 46.3 % — SIGNIFICANT CHANGE UP (ref 39–50)
HGB BLD-MCNC: 15.1 G/DL — SIGNIFICANT CHANGE UP (ref 13–17)
IMM GRANULOCYTES # BLD AUTO: 0.02 K/UL — SIGNIFICANT CHANGE UP (ref 0–0.07)
IMM GRANULOCYTES NFR BLD AUTO: 0.2 % — SIGNIFICANT CHANGE UP (ref 0–0.9)
LYMPHOCYTES # BLD AUTO: 3.75 K/UL — HIGH (ref 1–3.3)
LYMPHOCYTES NFR BLD AUTO: 43.5 % — SIGNIFICANT CHANGE UP (ref 13–44)
MAGNESIUM SERPL-MCNC: 1.9 MG/DL — SIGNIFICANT CHANGE UP (ref 1.6–2.6)
MCHC RBC-ENTMCNC: 27.7 PG — SIGNIFICANT CHANGE UP (ref 27–34)
MCHC RBC-ENTMCNC: 32.6 G/DL — SIGNIFICANT CHANGE UP (ref 32–36)
MCV RBC AUTO: 84.8 FL — SIGNIFICANT CHANGE UP (ref 80–100)
MONOCYTES # BLD AUTO: 0.65 K/UL — SIGNIFICANT CHANGE UP (ref 0–0.9)
MONOCYTES NFR BLD AUTO: 7.5 % — SIGNIFICANT CHANGE UP (ref 2–14)
NEUTROPHILS # BLD AUTO: 3.84 K/UL — SIGNIFICANT CHANGE UP (ref 1.8–7.4)
NEUTROPHILS NFR BLD AUTO: 44.6 % — SIGNIFICANT CHANGE UP (ref 43–77)
NRBC # BLD AUTO: 0 K/UL — SIGNIFICANT CHANGE UP (ref 0–0)
NRBC # FLD: 0 K/UL — SIGNIFICANT CHANGE UP (ref 0–0)
NRBC BLD AUTO-RTO: 0 /100 WBCS — SIGNIFICANT CHANGE UP (ref 0–0)
PLATELET # BLD AUTO: 248 K/UL — SIGNIFICANT CHANGE UP (ref 150–400)
PMV BLD: 10.5 FL — SIGNIFICANT CHANGE UP (ref 7–13)
POTASSIUM SERPL-MCNC: 4.5 MMOL/L — SIGNIFICANT CHANGE UP (ref 3.5–5.3)
POTASSIUM SERPL-SCNC: 4.5 MMOL/L — SIGNIFICANT CHANGE UP (ref 3.5–5.3)
PROT SERPL-MCNC: 7.2 G/DL — SIGNIFICANT CHANGE UP (ref 6.6–8.7)
RBC # BLD: 5.46 M/UL — SIGNIFICANT CHANGE UP (ref 4.2–5.8)
RBC # FLD: 13.7 % — SIGNIFICANT CHANGE UP (ref 10.3–14.5)
SODIUM SERPL-SCNC: 140 MMOL/L — SIGNIFICANT CHANGE UP (ref 135–145)
WBC # BLD: 8.62 K/UL — SIGNIFICANT CHANGE UP (ref 3.8–10.5)
WBC # FLD AUTO: 8.62 K/UL — SIGNIFICANT CHANGE UP (ref 3.8–10.5)

## 2025-07-16 PROCEDURE — 99283 EMERGENCY DEPT VISIT LOW MDM: CPT

## 2025-07-16 PROCEDURE — 82962 GLUCOSE BLOOD TEST: CPT

## 2025-07-16 PROCEDURE — 85025 COMPLETE CBC W/AUTO DIFF WBC: CPT

## 2025-07-16 PROCEDURE — 83735 ASSAY OF MAGNESIUM: CPT

## 2025-07-16 PROCEDURE — 83036 HEMOGLOBIN GLYCOSYLATED A1C: CPT

## 2025-07-16 PROCEDURE — 36415 COLL VENOUS BLD VENIPUNCTURE: CPT

## 2025-07-16 PROCEDURE — 80053 COMPREHEN METABOLIC PANEL: CPT

## 2025-07-16 PROCEDURE — 99284 EMERGENCY DEPT VISIT MOD MDM: CPT

## 2025-07-16 RX ORDER — SODIUM CHLORIDE 9 G/1000ML
1000 INJECTION, SOLUTION INTRAVENOUS ONCE
Refills: 0 | Status: COMPLETED | OUTPATIENT
Start: 2025-07-16 | End: 2025-07-16

## 2025-07-16 RX ADMIN — SODIUM CHLORIDE 1000 MILLILITER(S): 9 INJECTION, SOLUTION INTRAVENOUS at 08:55

## 2025-07-16 NOTE — ED ADULT NURSE NOTE - OBJECTIVE STATEMENT
Pt presents to ED with c.o that he has been feeling more thirsty, hungry and has been feeling more hungry the past 2 days. pt denies having diabetes but states his blood sugar has been on the higher side of normal. Pt states he has been feeling tingling in his finger tips and light headed Pt denies chest pain/sob,n,v,d. Respirations are even and unlabored. Pt is A&Ox3.

## 2025-07-16 NOTE — ED PROVIDER NOTE - OBJECTIVE STATEMENT
35-year-old male with no past medical history presents with paresthesias.  Patient reports that he has had several days of tingling in his bilateral fingertips.  He reports associated polyuria and polydipsia and has been told in the past that he is borderline diabetic and was concerned for elevated blood sugar.  He denies any headache, chest pain, or weakness, fevers, vomiting.

## 2025-07-16 NOTE — ED ADULT NURSE NOTE - CHIEF COMPLAINT QUOTE
pt arrives complaining of the symptoms of "high blood sugar" last night. states that he feels like his skin was itchy and he feels like the room was spinning. Pt states that symptoms subsided before he fell asleep. Would like his blood work checked. BG 99 in triage

## 2025-07-16 NOTE — ED PROVIDER NOTE - CLINICAL SUMMARY MEDICAL DECISION MAKING FREE TEXT BOX
Patient reports improvement in symptoms following hydration, he has a normal neurologic examination without any deficits, lab work without acute pathology, he is well-appearing and stable for discharge

## 2025-07-16 NOTE — ED PROVIDER NOTE - CPE EDP HEME LYMPH NORM
Group Topic:  Education    Date: 6/10/2024  Start Time: 1600  End Time: 1601  Facilitators: Mayur Alvarez CNA        Method: Group  Attendance: Late  Participation: Not appropriate       normal...

## 2025-07-16 NOTE — ED PROVIDER NOTE - PATIENT PORTAL LINK FT
You can access the FollowMyHealth Patient Portal offered by [facility] by registering at the following website: [email or web address]. By joining [employer]’s FollowMyHealth portal, you will also be able to view your health information using other applications (apps) compatible with our system.

## 2025-08-11 ENCOUNTER — APPOINTMENT (OUTPATIENT)
Dept: INTERNAL MEDICINE | Facility: CLINIC | Age: 35
End: 2025-08-11
Payer: COMMERCIAL

## 2025-08-11 VITALS
SYSTOLIC BLOOD PRESSURE: 110 MMHG | TEMPERATURE: 99.1 F | DIASTOLIC BLOOD PRESSURE: 86 MMHG | HEART RATE: 89 BPM | BODY MASS INDEX: 30.52 KG/M2 | HEIGHT: 71 IN | OXYGEN SATURATION: 97 % | WEIGHT: 218 LBS

## 2025-08-11 DIAGNOSIS — Z00.00 ENCOUNTER FOR GENERAL ADULT MEDICAL EXAMINATION W/OUT ABNORMAL FINDINGS: ICD-10-CM

## 2025-08-11 PROCEDURE — 99395 PREV VISIT EST AGE 18-39: CPT

## 2025-08-12 LAB
25(OH)D3 SERPL-MCNC: 22.4 NG/ML
ALBUMIN SERPL ELPH-MCNC: 4.8 G/DL
ALP BLD-CCNC: 81 U/L
ALT SERPL-CCNC: 44 U/L
ANION GAP SERPL CALC-SCNC: 13 MMOL/L
APPEARANCE: CLEAR
AST SERPL-CCNC: 28 U/L
BASOPHILS # BLD AUTO: 0.06 K/UL
BASOPHILS NFR BLD AUTO: 0.7 %
BILIRUB SERPL-MCNC: 0.9 MG/DL
BILIRUBIN URINE: NEGATIVE
BLOOD URINE: NEGATIVE
BUN SERPL-MCNC: 12 MG/DL
CALCIUM SERPL-MCNC: 10.9 MG/DL
CHLORIDE SERPL-SCNC: 105 MMOL/L
CHOLEST SERPL-MCNC: 213 MG/DL
CO2 SERPL-SCNC: 23 MMOL/L
COLOR: YELLOW
CREAT SERPL-MCNC: 1.06 MG/DL
EGFRCR SERPLBLD CKD-EPI 2021: 95 ML/MIN/1.73M2
EOSINOPHIL # BLD AUTO: 0.31 K/UL
EOSINOPHIL NFR BLD AUTO: 3.8 %
ESTIMATED AVERAGE GLUCOSE: 114 MG/DL
GLUCOSE QUALITATIVE U: NEGATIVE MG/DL
GLUCOSE SERPL-MCNC: 95 MG/DL
HBA1C MFR BLD HPLC: 5.6 %
HCT VFR BLD CALC: 51.1 %
HDLC SERPL-MCNC: 46 MG/DL
HGB BLD-MCNC: 16.2 G/DL
HIV1+2 AB SPEC QL IA.RAPID: NONREACTIVE
IMM GRANULOCYTES NFR BLD AUTO: 0.1 %
KETONES URINE: NEGATIVE MG/DL
LDLC SERPL-MCNC: 143 MG/DL
LEUKOCYTE ESTERASE URINE: NEGATIVE
LYMPHOCYTES # BLD AUTO: 4.36 K/UL
LYMPHOCYTES NFR BLD AUTO: 52.8 %
MAN DIFF?: NORMAL
MCHC RBC-ENTMCNC: 27.3 PG
MCHC RBC-ENTMCNC: 31.7 G/DL
MCV RBC AUTO: 86 FL
MONOCYTES # BLD AUTO: 0.66 K/UL
MONOCYTES NFR BLD AUTO: 8 %
NEUTROPHILS # BLD AUTO: 2.86 K/UL
NEUTROPHILS NFR BLD AUTO: 34.6 %
NITRITE URINE: NEGATIVE
NONHDLC SERPL-MCNC: 167 MG/DL
PH URINE: 5.5
PLATELET # BLD AUTO: 224 K/UL
POTASSIUM SERPL-SCNC: 4.7 MMOL/L
PROT SERPL-MCNC: 7.8 G/DL
PROTEIN URINE: NEGATIVE MG/DL
RBC # BLD: 5.94 M/UL
RBC # FLD: 14.6 %
SODIUM SERPL-SCNC: 142 MMOL/L
SPECIFIC GRAVITY URINE: 1.02
TRIGL SERPL-MCNC: 135 MG/DL
TSH SERPL-ACNC: 1.71 UIU/ML
URATE SERPL-MCNC: 5.8 MG/DL
UROBILINOGEN URINE: 0.2 MG/DL
WBC # FLD AUTO: 8.26 K/UL

## 2025-08-13 LAB
HCV AB SER QL: NONREACTIVE
HCV S/CO RATIO: 0.18 S/CO

## 2025-08-19 ENCOUNTER — APPOINTMENT (OUTPATIENT)
Dept: INTERNAL MEDICINE | Facility: CLINIC | Age: 35
End: 2025-08-19
Payer: COMMERCIAL

## 2025-08-19 VITALS
SYSTOLIC BLOOD PRESSURE: 120 MMHG | OXYGEN SATURATION: 96 % | WEIGHT: 221 LBS | BODY MASS INDEX: 30.94 KG/M2 | TEMPERATURE: 97 F | HEIGHT: 71 IN | HEART RATE: 98 BPM | DIASTOLIC BLOOD PRESSURE: 85 MMHG | RESPIRATION RATE: 15 BRPM

## 2025-08-19 DIAGNOSIS — B35.6 TINEA CRURIS: ICD-10-CM

## 2025-08-19 DIAGNOSIS — E78.5 HYPERLIPIDEMIA, UNSPECIFIED: ICD-10-CM

## 2025-08-19 PROCEDURE — 99214 OFFICE O/P EST MOD 30 MIN: CPT | Mod: GC

## 2025-08-19 PROCEDURE — G2211 COMPLEX E/M VISIT ADD ON: CPT

## 2025-08-19 RX ORDER — TERBINAFINE HYDROCHLORIDE 250 MG/1
250 TABLET ORAL DAILY
Qty: 14 | Refills: 0 | Status: ACTIVE | COMMUNITY
Start: 2025-08-19 | End: 1900-01-01

## 2025-09-12 ENCOUNTER — NON-APPOINTMENT (OUTPATIENT)
Age: 35
End: 2025-09-12